# Patient Record
(demographics unavailable — no encounter records)

---

## 2024-11-18 NOTE — HISTORY OF PRESENT ILLNESS
[FreeTextEntry1] : This 30-year-old G1, P0 LMP September 22 presents for evaluation of secondary amenorrhea.  She had a faintly positive pregnancy test last week, and now had a bright 1 today.  She has menstrual cycles that vary between 25 and 40 days.  She denies any bleeding or nausea and vomiting. 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

## 2024-11-18 NOTE — DISCUSSION/SUMMARY
[FreeTextEntry1] : Possible gestational sac, no FH yet.  The patient will return to the office next week for an official ultrasound.  She is sent for serial betas.  Up calling in prenatal vitamins for her.  We spent 45 minutes in consultation.

## 2024-11-18 NOTE — PROCEDURE
[Transvaginal OB Sonogram] : Transvaginal OB Sonogram [Intrauterine Pregnancy] : intrauterine pregnancy [FreeTextEntry1] : Possible gestational sac noted with suggestion of a fetal pole with no FH yet.  She appears to have a fibroid uterus and a possible pedunculated fibroid.

## 2024-12-06 NOTE — HISTORY OF PRESENT ILLNESS
[FreeTextEntry1] : This 30-year-old  presents to have an ultrasound for fetal viability.  She denies any bleeding or cramping.  She has had some mild nausea but declines any medications for it and denies having any vomiting.  She has no other complaints

## 2024-12-06 NOTE — PROCEDURE
[Transvaginal OB Sonogram] : Transvaginal OB Sonogram [Intrauterine Pregnancy] : intrauterine pregnancy [Yolk Sac] : yolk sac present [Fetal Heart] : fetal heart present [CRL: ___ (mm)] : CRL - [unfilled]Umm [Date: ___] : Date: [unfilled] [Current GA by Sonogram: ___ (wks)] : Current GA by Sonogram: [unfilled]Uwks [___ day(s)] : [unfilled] days [Transvaginal OB Sonogram WNL] : Transvaginal OB Sonogram WNL [FreeTextEntry1] : viable iup noted, 6 weeks 6 days, sga, pt has iireg periods.

## 2024-12-06 NOTE — PLAN
[FreeTextEntry1] : Viable IUP at 6 weeks and 6 days.  The patient will return to the office for first prenatal visit in 2 to 3 weeks.  We discussed diet and activity restrictions.  We spent 30 minutes in this consultation.

## 2025-05-16 NOTE — HISTORY OF PRESENT ILLNESS
[FreeTextEntry1] : Leodan Gardner is a 31 y.o.  with LMP of 24 and EDC of 25 (by first trimester ultrasound) who presents at 29w5d for  consultation secondary to GDM, anemia, and fibroid uterus. Her BMI is 19.6 kg/m2. Leodan feels well and reports no other pregnancy complication to date.  She has had diabetic education and nutritional counseling and reports compliance with dietary recommendations.  She uses a DexCom CGMS to monitor BG values. She feels well and reports no obstetrical or constitutional complaint.  Leodan confirms fetal movement.

## 2025-05-16 NOTE — DISCUSSION/SUMMARY
[FreeTextEntry1] : At the time of consultation, we first discussed dietary recommendations, blood sugar testing, and managing diabetes during pregnancy. Tight glycemic control in pregnancy is necessary to decrease fetal and  risks including macrosomia, shoulder dystocia, medically or obstetrically indicated  delivery, polyhydramnios, and preeclampsia. She has a continuous glucose monitor and checks capillary BG values on occasion to confirm the values. Her CGM consistently slightly overestimates her glucose. She was instructed to continue monitoring values. Review of CGM output reveals overall good control with only 2 values elevated above the target of 140. There is no indication to begin medical therapy at this time. She is scheduled for ultrasound evaluation of fetal growth on 25. Leodan understands she will require nutritionist consultation and serial growth ultrasounds until the end of the pregnancy. She has a growth in your office in 2 weeks and will schedule further follow up with our office in 6 weeks. Delivery between 39 - 40  is recommended if diabetes remains controlled with diet and exercise alone.  In the event medical therapy is needed, delivery by EDC is recommended. We discussed the 50% chance that she will acquire diabetes in her lifetime due to her diagnosis of diabetes in pregnancy. She is encouraged to have a two-hour glucose tolerance test at 6-8 weeks postpartum, or a hemoglobin A1c to evaluate for diabetes mellitus and insulin resistance.  Leodan has beta thalassemia trait with a microcytic anemia noted on recent laboratory evaluation. Beta thalassemia trait (also called beta thalassemia minor trait) results in a variable degree of illness, depending on the rate of beta chain production. The characteristic findings include a relatively high RBC membrane rigidity, moderate to marked microcytosis, and a peripheral smear resembling that observed in iron deficiency.  Mothers with beta thalassemia trait may experience a more severe physiologic anemia of pregnancy. If MCV<80, then coexisting iron deficiency should be excluded.  Recent ferritin values was normal.  Symptomatic anemia is best treated with PRBC transfusion. Women with anemia are at increased risk of transfusion,  delivery, and  delivery. There is also an increased risk of adverse fetal outcomes, including fetal growth restriction, 5-minute Apgar <7, and intensive care unit admission.  Maternal anemia may also be associated with postpartum depression and impaired maternal postpartum cognition.  Today she denies any symptoms such as dizziness, fatigue, shortness of breath, and chest pain. A hematology consultation is recommended and referral provided. For completeness, we reviewed the pattern of inheritance of beta thalassemia. Since the beta globin gene exists as a single copy, the odds of two parents, each carrying beta thalassemia trait, giving rise to a child with homozygous beta thalassemia are one in four, since the gene follows straightforward Mendelian rules of inheritance. The odds of their child having heterozygous thalassemia (thalassemia trait) are one in two, and the odds are one in four that the child's hemoglobin profile will be normal.  If her partner does not have beta thalassemia trait, then her fetus is not at risk for beta thalassemia.  Partner testing is advised if not already performed. Genetic counseling is also available.   Finally, Leodan was noted to a have a left lateral pedunculated fibroid at the time of fetal anatomy scan. Uterine fibroids are the most common benign (noncancerous) gynecologic tumors and are found in 1-10% of women during pregnancy. Fibroid-related pain occurs in 5-15% of patients. The risk of pain increases with size and is high with fibroids >5cm in diameter. Degeneration is the most specific complication of fibroid in pregnancy, occurring in about 5% of cases most commonly in the first and early second trimester. There is a higher incidence of dysfunctional labor although this fibroid does not appear likely to obstruct labor. Precautions were reviewed, as well as the increased risk for torsion in the postpartum period. She was advised to present for evaluation should she have any new or concerning symptoms. The fibroid will be reevaluated at the time of routine ultrasound.   At the end of our discussion, the patient indicated that her questions were answered, and she seemed satisfied with our discussion. All her questions were answered Please do not hesitate to contact us with any questions. The patient was seen by Dr. Anton and Dr. Lawrence.

## 2025-05-16 NOTE — OB HISTORY
[LMP: ___] : LMP: [unfilled] [DUANE: ___] : DUANE: [unfilled] [EGA: ___ wks] : EGA: [unfilled] wks [Sonogram] : sonogram [at ___ wks] : at [unfilled] weeks [FreeTextEntry1] : GDM diagnosed by routine testing  Initial diabetic education 4/29/25

## 2025-05-16 NOTE — DISCUSSION/SUMMARY
[FreeTextEntry1] : At the time of consultation, we first discussed dietary recommendations, blood sugar testing, and managing diabetes during pregnancy. Tight glycemic control in pregnancy is necessary to decrease fetal and  risks including macrosomia, shoulder dystocia, medically or obstetrically indicated  delivery, polyhydramnios, and preeclampsia. She has a continuous glucose monitor and checks capillary BG values on occasion to confirm the values. Her CGM consistently slightly overestimates her glucose. She was instructed to continue monitoring values. Review of CGM output reveals overall good control with only 2 values elevated above the target of 140. There is no indication to begin medical therapy at this time. She is scheduled for ultrasound evaluation of fetal growth on 25. Leodan understands she will require nutritionist consultation and serial growth ultrasounds until the end of the pregnancy. She has a growth in your office in 2 weeks and will schedule further follow up with our office in 6 weeks. Delivery between 39 - 40  is recommended if diabetes remains controlled with diet and exercise alone.  In the event medical therapy is needed, delivery by EDC is recommended. We discussed the 50% chance that she will acquire diabetes in her lifetime due to her diagnosis of diabetes in pregnancy. She is encouraged to have a two-hour glucose tolerance test at 6-8 weeks postpartum, or a hemoglobin A1c to evaluate for diabetes mellitus and insulin resistance.  Leodan has beta thalassemia trait with a microcytic anemia noted on recent laboratory evaluation. Beta thalassemia trait (also called beta thalassemia minor trait) results in a variable degree of illness, depending on the rate of beta chain production. The characteristic findings include a relatively high RBC membrane rigidity, moderate to marked microcytosis, and a peripheral smear resembling that observed in iron deficiency.  Mothers with beta thalassemia trait may experience a more severe physiologic anemia of pregnancy. If MCV<80, then coexisting iron deficiency should be excluded.  Recent ferritin values was normal.  Symptomatic anemia is best treated with PRBC transfusion. Women with anemia are at increased risk of transfusion,  delivery, and  delivery. There is also an increased risk of adverse fetal outcomes, including fetal growth restriction, 5-minute Apgar <7, and intensive care unit admission.  Maternal anemia may also be associated with postpartum depression and impaired maternal postpartum cognition.  Today she denies any symptoms such as dizziness, fatigue, shortness of breath, and chest pain. A hematology consultation is recommended and referral provided. For completeness, we reviewed the pattern of inheritance of beta thalassemia. Since the beta globin gene exists as a single copy, the odds of two parents, each carrying beta thalassemia trait, giving rise to a child with homozygous beta thalassemia are one in four, since the gene follows straightforward Mendelian rules of inheritance. The odds of their child having heterozygous thalassemia (thalassemia trait) are one in two, and the odds are one in four that the child's hemoglobin profile will be normal.  If her partner does not have beta thalassemia trait, then her fetus is not at risk for beta thalassemia.  Partner testing is advised if not already performed. Genetic counseling is also available.   Finally, Leodan was noted to a have a left lateral pedunculated fibroid at the time of fetal anatomy scan. Uterine fibroids are the most common benign (noncancerous) gynecologic tumors and are found in 1-10% of women during pregnancy. Fibroid-related pain occurs in 5-15% of patients. The risk of pain increases with size and is high with fibroids >5cm in diameter. Degeneration is the most specific complication of fibroid in pregnancy, occurring in about 5% of cases most commonly in the first and early second trimester. There is a higher incidence of dysfunctional labor although this fibroid does not appear likely to obstruct labor. Precautions were reviewed, as well as the increased risk for torsion in the postpartum period. She was advised to present for evaluation should she have any new or concerning symptoms. The fibroid will be reevaluated at the time of routine ultrasound.   At the end of our discussion, the patient indicated that her questions were answered, and she seemed satisfied with our discussion. All her questions were answered Please do not hesitate to contact us with any questions. The patient was seen by Dr. Anton and Dr. aLwrence.

## 2025-05-16 NOTE — SIGNATURES
[TextEntry] : Rodrigo Anton DO Fellow, Maternal Fetal Medicine  Jacqueline Lawrence MD Attending, Maternal Fetal Medicine

## 2025-05-16 NOTE — DATA REVIEWED
[FreeTextEntry1] : 12/30/24 NIPS low risk male  2/8/25 msAFP 0.80 MoM Hg electrophoresis beta thalassemia minor  3/14/25 - normal fetal anatomy evaluation, 4.6 x 4.9 x 5.1 cm right lateral / pedunculated fibroid 4/11/25  H/H 9.9/32.4, MCV 77.7  4/22/25 GTT 66 / 179 / 174 / 159 Ferritin 42

## 2025-05-22 NOTE — REASON FOR VISIT
[Initial Consultation] : an initial consultation for [Spouse] : spouse [FreeTextEntry2] : Anemia in pregnancy

## 2025-05-22 NOTE — PLAN
[TextEntry] : In light of today's hemoglobin which is now up to 10.7g/dL from 9.9g/dL I am recommending she continues on PO iron with vitamin C. No need for infusions at this time. Still pending iron panel and ferritin levels from today. Will call patient with any significant findings if warranted.   She will return in 4 weeks (~35-week gestation) for a follow up and labs.   Continue OB and MFM f/u as scheduled.

## 2025-05-22 NOTE — HISTORY OF PRESENT ILLNESS
[de-identified] : Patient is a 32 yo female  LMP 2025 DUANE 25 presenting today for initial consultation for anemia in pregnancy. Patient's medical history includes gestational diabetes and beta thalassemia trait. She is currently 31 weeks pregnant, planned for vaginal delivery. She is followed by Dr. Escamilla and JIGAR. Hemoglobin in 2025 was down to 9.9g/dL with an MCV of 77.7. Patient continues her prenatal vitamins that contain iron. In addition, she was started on an iron supplement with vitamin C a month ago. She denies constipation or GI upset from PO iron. Patient denies any GI history, acid reflux or use of PPI/ [id number].  She is not symptomatic from an anemia standpoint. 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 She admits to fatigue which she contributes to work and pregnancy. Otherwise, she denies SOB, dizziness, chest pain, palpitations, vaginal bleeding or dark stools. She admits to fetal movement and Giovanni pittman. Denies fluid leakage or regular contractions.   Labs 25: hgb 9.9g/dL (previously 11.3 from December), MCV 77.7 (previously 76.1 from December)  Ferritin 25: 42.   Health maintenance:  Next OB visit   MFM  Colonoscopy never  Endoscopy never

## 2025-05-22 NOTE — ASSESSMENT
[FreeTextEntry1] : 32 yo female  LMP 2025 DUANE 25, 31 weeks pregnant being seen for evaluation and management of anemia in pregnancy.   #Anemia: Hgb trend from December to current- 11.3 -> 9.9 -> today 10.7g/dL  MCV trend December to current- 76.1 -> 77.7 -> 75.8 (overall stable) Patient has hx of beta thal trait Remainder of CBC WNL.  Pending iron panel, ferritin  #Beta thal trait Microcytic - may be secondary to iron def vs thalassemia  CTM

## 2025-05-22 NOTE — ASSESSMENT
[FreeTextEntry1] : 30 yo female  LMP 2025 DUANE 25, 31 weeks pregnant being seen for evaluation and management of anemia in pregnancy.   #Anemia: Hgb trend from December to current- 11.3 -> 9.9 -> today 10.7g/dL  MCV trend December to current- 76.1 -> 77.7 -> 75.8 (overall stable) Patient has hx of beta thal trait Remainder of CBC WNL.  Pending iron panel, ferritin  #Beta thal trait Microcytic - may be secondary to iron def vs thalassemia  CTM

## 2025-05-22 NOTE — HISTORY OF PRESENT ILLNESS
[de-identified] : Patient is a 30 yo female  LMP 2025 DUANE 25 presenting today for initial consultation for anemia in pregnancy. Patient's medical history includes gestational diabetes and beta thalassemia trait. She is currently 31 weeks pregnant, planned for vaginal delivery. She is followed by Dr. Escamilla and JIGAR. Hemoglobin in 2025 was down to 9.9g/dL with an MCV of 77.7. Patient continues her prenatal vitamins that contain iron. In addition, she was started on an iron supplement with vitamin C a month ago. She denies constipation or GI upset from PO iron. Patient denies any GI history, acid reflux or use of PPI/ [id number].  She is not symptomatic from an anemia standpoint. 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 Otherwise, she denies SOB, dizziness, chest pain, palpitations, vaginal bleeding or dark stools. She admits to fetal movement and Giovanni pittman. Denies fluid leakage or regular contractions.   Labs 25: hgb 9.9g/dL (previously 11.3 from December), MCV 77.7 (previously 76.1 from December)  Ferritin 25: 42.   Health maintenance:  Next OB visit   MFM  Colonoscopy never  Endoscopy never    DISCHARGE

## 2025-06-27 NOTE — VITALS
[US Date: ___] : ultrasound performed on [unfilled]. [GA= ___ Weeks] : Results were GA of [unfilled] weeks [GA= ___ Days] : and [unfilled] day(s) [DUANE by US (date): ___] : The calculated DUANE by US is [unfilled] [By US] : this is the final DUANE

## 2025-06-27 NOTE — FAMILY HISTORY
[Age 35+ During Pregnancy] : not 35 or over during pregnancy [Reported Family History Of Birth Defects] : no congenital heart defects [Alexis-Sachs Carrier] : no Alexis-Sachs [Family History] : no mental retardation/autism [Reported Family History Of Genetic Disease] : no history of child defect in child of baby father

## 2025-06-29 NOTE — HISTORY OF PRESENT ILLNESS
[FreeTextEntry1] : Leodan Gardner is a 31 y.o.  with LMP of 24 and EDC of 25 (by first trimester ultrasound) who presents at 35w6d for follow up  consultation secondary to GDM, anemia, and fibroid uterus. Her BMI is 20.8 kg/m2. Patient is doing well today. Denies contractions, LOF, vaginal bleeding. Reports normal fetal movement. Growth ultrasound earlier this week with EFW 15%. Has dexcom and checks fingersticks as she thinks dexcom is not always working. Outpur shows 89% of values in range with fasting blood sugar 70-80 and 1-hour postprandial 120-130 with 140-150 one or two times a week. Follows with Hematology for anemia and known beta-thalassemia.

## 2025-06-29 NOTE — SIGNATURES
[TextEntry] : Nadine Osorio MD  Fellow, Maternal-Fetal Medicine   Jacqueline Lawrence MD  Attending, Maternal-Fetal Medicine

## 2025-06-29 NOTE — DATA REVIEWED
[FreeTextEntry1] : TAUS 6/25/25 - vertex, anterior placenta, MARAH 10.18 cm, erw 2355 gm (15%), AC 19%, BPP 8/8, uterine fibroid not evaluated  6/25/25 H/H 10.3/32 MCV 75.8 Plt 214

## 2025-06-29 NOTE — SURGICAL HISTORY
[Last Pap: ___] : Last Pap: [unfilled] [Fibroids] : no fibroids [Abn Paps] : no abnormal pap smears [Breast Disease] : no breast disease [STI's] : no STI's [Infertility] : no infertility [Cysts] : no cysts [OC Use] : no OC use Burow's Graft Text: The defect edges were debeveled with a #15 scalpel blade.  Given the location of the defect, shape of the defect, the proximity to free margins and the presence of a standing cone deformity a Burow's skin graft was deemed most appropriate. The standing cone was removed and this tissue was then trimmed to the shape of the primary defect. The adipose tissue was also removed until only dermis and epidermis were left.  The skin margins of the secondary defect were undermined to an appropriate distance in all directions utilizing iris scissors.  The secondary defect was closed with interrupted buried subcutaneous sutures.  The skin edges were then re-apposed with running  sutures.  The skin graft was then placed in the primary defect and oriented appropriately.

## 2025-06-29 NOTE — OB HISTORY
[Approximately ___ (Month)] : the LMP was approximately [unfilled] month(s) ago [Normal Amount/Duration] : was of a normal amount and duration [Spontaneous] : Spontaneous conception [Sonogram] : sonogram [at ___ wks] : at [unfilled] weeks [Spotting Between  Menses] : no spotting between menses [Regular Cycle Intervals] : periods have been irregular [LMP: ___] : LMP: [unfilled] [DUANE: ___] : DUANE: [unfilled] [EGA: ___ wks] : EGA: [unfilled] wks [FreeTextEntry1] : GDM diagnosed by routine testing Initial diabetic education 4/29/25 Initial MFM consultation 5/15/25

## 2025-06-29 NOTE — DISCUSSION/SUMMARY
[FreeTextEntry1] : We had the pleasure of seeing Ms. Gardner for a follow up Maternal-Fetal Medicine consultation today. She is a 32yo  at 35 weeks and 6 days of gestation with a history of  GDMA1, anemia, beta thal trait, and a pedunculated fibroid.  A1GDM: Regarding her gestational diabetes, Leodan currently is well controlled with dietary and lifestyle modification. She states that she has been following the recommended diabetic diet. She utilizes both a CGMS and intermittent capillary BG testing to monitor values. She understands that maintaining euglycemia is the most crucial factor associated with good  outcomes in pregnancies complicated by gestational diabetes. She is scheduled to have a follow-up telehealth visit with diabetic education on 7/10/25. Recent ultrasound showed an AGA fetus with normal MARAH and reassuring BPP. Previously noted uterine fibroid was not able to be evaluated. If GDM remains well controlled with nutrition and diet alone, delivery is recommend at 39 - 40 6/7 weeks.   Anemia/beta thalassemia trait: Khadijah has routine follow up with Hematology. 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    At the end of our discussion, the patient indicated that her questions were answered, and she seemed satisfied with our discussion. Please do not hesitate to contact us with any questions.  The patient was seen by Dr. Osorio and Dr. Lawrence.